# Patient Record
Sex: FEMALE | Race: BLACK OR AFRICAN AMERICAN | NOT HISPANIC OR LATINO | Employment: UNEMPLOYED | URBAN - METROPOLITAN AREA
[De-identification: names, ages, dates, MRNs, and addresses within clinical notes are randomized per-mention and may not be internally consistent; named-entity substitution may affect disease eponyms.]

---

## 2019-06-17 ENCOUNTER — HOSPITAL ENCOUNTER (EMERGENCY)
Facility: HOSPITAL | Age: 29
Discharge: HOME | End: 2019-06-17
Attending: EMERGENCY MEDICINE
Payer: MEDICAID

## 2019-06-17 VITALS
OXYGEN SATURATION: 99 % | TEMPERATURE: 98 F | SYSTOLIC BLOOD PRESSURE: 126 MMHG | BODY MASS INDEX: 33.17 KG/M2 | RESPIRATION RATE: 20 BRPM | WEIGHT: 206.38 LBS | DIASTOLIC BLOOD PRESSURE: 72 MMHG | HEIGHT: 66 IN | HEART RATE: 81 BPM

## 2019-06-17 DIAGNOSIS — M25.561 RIGHT KNEE PAIN: ICD-10-CM

## 2019-06-17 DIAGNOSIS — S80.01XA CONTUSION OF RIGHT KNEE, INITIAL ENCOUNTER: ICD-10-CM

## 2019-06-17 DIAGNOSIS — M23.91 INTERNAL DERANGEMENT OF RIGHT KNEE: Primary | ICD-10-CM

## 2019-06-17 PROCEDURE — 99283 EMERGENCY DEPT VISIT LOW MDM: CPT | Mod: 25

## 2019-06-17 PROCEDURE — 25000003 PHARM REV CODE 250: Performed by: EMERGENCY MEDICINE

## 2019-06-17 PROCEDURE — 29505 APPLICATION LONG LEG SPLINT: CPT | Mod: RT

## 2019-06-17 PROCEDURE — 86703 HIV-1/HIV-2 1 RESULT ANTBDY: CPT

## 2019-06-17 RX ORDER — NAPROXEN 500 MG/1
500 TABLET ORAL
Status: COMPLETED | OUTPATIENT
Start: 2019-06-17 | End: 2019-06-17

## 2019-06-17 RX ORDER — NAPROXEN 500 MG/1
500 TABLET ORAL 2 TIMES DAILY WITH MEALS
Qty: 20 TABLET | Refills: 0 | Status: SHIPPED | OUTPATIENT
Start: 2019-06-17 | End: 2019-06-27

## 2019-06-17 RX ADMIN — NAPROXEN 500 MG: 500 TABLET ORAL at 07:06

## 2019-06-17 NOTE — ED NOTES
"Pt c/o right "knee buckling" x2 days ago while walking. Pain rated 10/10.    Patient identifiers verified and correct for Joleen Horner.    LOC: The patient is awake, alert and aware of environment with an appropriate affect, the patient is oriented x 3 and speaking appropriately.  APPEARANCE: Patient resting comfortably and in no acute distress, patient is clean and well groomed, patient's clothing is properly fastened.  SKIN: The skin is warm and dry, color consistent with ethnicity, patient has normal skin turgor and moist mucus membranes, skin intact, no breakdown or bruising noted.  MUSCULOSKELETAL: Patient moving all extremities spontaneously. Swelling noted to right knee with some guarding and grimacing.   RESPIRATORY: Airway is open and patent, respirations are spontaneous.  CARDIAC: Patient has a normal rate, no periphreal edema noted, capillary refill < 3 seconds.  ABDOMEN: Soft and non tender to palpation.    "

## 2019-06-17 NOTE — ED PROVIDER NOTES
"SCRIBE #1 NOTE: I, Sixto Holting, am scribing for, and in the presence of, Dontae Montes Jr., MD. I have scribed the entire note.       History     Chief Complaint   Patient presents with    Knee Pain     pt reports R knee "buckled" while walking yesterday; reports swelling and pain to R knee     Review of patient's allergies indicates:  No Known Allergies      History of Present Illness     HPI    6/17/2019, 6:48 AM  History obtained from the patient      History of Present Illness: Joleen Horner is a 28 y.o. female patient who presents to the Emergency Department for evaluation of right knee pain which onset 2 days ago. Pt reports that the she was just walking 2 days ago, when her right knee "buckled" backwards. She reportedly fell backwards but denies falling onto the knee or hitting her head. She reports being seen at [facility] in the past for this same issue, and was dx with a "little tear" in her ACL. She reports associated difficulty walking, swelling, decreased ROM, and right foot numbness. The foot numbness is reportedly a new sx. Symptoms are constant and moderate in severity. No mitigating or exacerbating factors reported. Patient denies any fever, chills, weakness, dizziness, Ha, lightheadedness, back pain, neck pain, and all other sxs at this time. No further complaints or concerns at this time.     Arrival mode: Personal vehicle    PCP: Primary Doctor No        Past Medical History:  History reviewed. No pertinent medical history.     Past Surgical History:  History reviewed. No pertinent surgical history.     Family History:  History reviewed. No pertinent family history.     Social History:  Social History     Tobacco Use    Smoking status: Unknown   Substance and Sexual Activity    Alcohol use: Unknown    Drug use: Unknown    Sexual activity: Unknown        Review of Systems     Review of Systems   Constitutional: Negative for chills and fever.   HENT: Negative for sore throat.    Respiratory: " Negative for shortness of breath.    Cardiovascular: Negative for chest pain.   Gastrointestinal: Negative for nausea and vomiting.   Genitourinary: Negative for dysuria.   Musculoskeletal: Positive for arthralgias (right knee), gait problem and joint swelling. Negative for back pain and neck pain.        (+) Decreased ROM   Skin: Negative for rash.   Neurological: Positive for numbness (right foot). Negative for dizziness, weakness, light-headedness and headaches.   Hematological: Does not bruise/bleed easily.   All other systems reviewed and are negative.       Physical Exam     Initial Vitals [06/17/19 0643]   BP Pulse Resp Temp SpO2   118/81 81 16 98.9 °F (37.2 °C) 97 %      MAP       --          Physical Exam  Nursing Notes and Vital Signs Reviewed.  Constitutional: Patient is in no acute distress. Well-developed and well-nourished.  Head: Atraumatic. Normocephalic.  Eyes: PERRL. EOM intact. Conjunctivae are not pale. No scleral icterus.  ENT: Mucous membranes are moist. Oropharynx is clear and symmetric.    Neck: Supple. Full ROM. No lymphadenopathy.  Cardiovascular: Regular rate. Regular rhythm. No murmurs, rubs, or gallops. Distal pulses are 2+ and symmetric.  Pulmonary/Chest: No respiratory distress. Clear to auscultation bilaterally. No wheezing or rales.  Abdominal: Soft and non-distended.  There is no tenderness.  No rebound, guarding, or rigidity.   Musculoskeletal: Moves all extremities. No obvious deformities.   Right Knee:  No obvious deformity. there is edema to the the anterior portion of the right knee. Limited ROM secondary to pain.  No increased warmth, erythema, induration or fluctuance.  No ligament laxity. DP and PT pulses are 2+ b/l.  Normal capillary refill.  Distal sensation is intact. N/V intact.  Skin: Warm and dry.  Neurological:  Alert, awake, and appropriate.  Normal speech.  No acute focal neurological deficits are appreciated.  Psychiatric: Normal affect. Good eye contact.  "Appropriate in content.     ED Course   Splint Application  Date/Time: 2019 7:44 AM  Performed by: Dontae Montes Jr., MD  Authorized by: Dontae Montes Jr., MD   Consent Done: Yes  Consent: Verbal consent obtained.  Risks and benefits: risks, benefits and alternatives were discussed  Consent given by: patient  Patient understanding: patient states understanding of the procedure being performed  Patient identity confirmed: , name, MRN and verbally with patient  Location details: right knee  Splint type: Knee immobilizer.  Post-procedure: The splinted body part was neurovascularly unchanged following the procedure.  Patient tolerance: Patient tolerated the procedure well with no immediate complications        ED Vital Signs:  Vitals:    19 0643 19 0808   BP: 118/81 126/72   Pulse: 81 81   Resp: 16 20   Temp: 98.9 °F (37.2 °C) 98.2 °F (36.8 °C)   TempSrc: Oral Oral   SpO2: 97% 99%   Weight: 93.6 kg (206 lb 5.6 oz)    Height: 5' 6" (1.676 m)        Abnormal Lab Results:  Labs Reviewed   HIV 1 / 2 ANTIBODY        All Lab Results:  None    Imaging Results:  Imaging Results          X-Ray Knee Complete 4 Or More Views Right (Final result)  Result time 19 07:18:34    Final result by Anthony Schmitz MD (19 07:18:34)                 Impression:      See above.      Electronically signed by: Anthony Schmitz MD  Date:    2019  Time:    07:18             Narrative:    EXAMINATION:  XR KNEE COMP 4 OR MORE VIEWS RIGHT    CLINICAL HISTORY:  Right knee injury with pain and swelling.,    TECHNIQUE:  Standard radiography performed.    COMPARISON:  None    FINDINGS:  No acute bony abnormality.  Possible small suprapatellar joint effusion.  Otherwise negative.                                        The Emergency Provider reviewed the vital signs and test results, which are outlined above.     ED Discussion     7:45 AM: Reassessed pt at this time.  Pt states her condition has improved at this " time. Discussed with pt all pertinent ED information and results. Discussed pt dx and plan of tx. Gave pt all f/u and return to the ED instructions. All questions and concerns were addressed at this time. Pt expresses understanding of information and instructions, and is comfortable with plan to discharge. Pt is stable for discharge.    I discussed with patient and/or family/caretaker that evaluation in the ED does not suggest any emergent or life threatening medical conditions requiring immediate intervention beyond what was provided in the ED, and I believe patient is safe for discharge.  Regardless, an unremarkable evaluation in the ED does not preclude the development or presence of a serious of life threatening condition. As such, patient was instructed to return immediately for any worsening or change in current symptoms.    Regarding KNEE PAIN: Patient instructed to follow prescribed therapy.  I encouraged patient to get plenty of rest, work to obtain/maintain healthy weight and BMI, exercise to improve muscle strength and motion to joint area, protect joint from further injury, and avoid overexerting extremity - especially when stiffness or pain is present. Advised patient to follow up with primary care provider in one week if symptoms are still present or condition worsens.     Regarding CONTUSIONS, I advised patient to: REST the injured area or use it less than usual; apply ICE to decrease swelling and pain and help prevent tissue damage; use COMPRESSION with an elastic bandage to support the area and decrease swelling; ELEVATE injured body part above the level of the heart to help decrease pain and swelling; AVOID using massage or massage to acute injuries as it may slow healing of the area; AVOID drinking alcohol as it may slow healing of the injury; and avoid stretching injured muscles. Advised patient to return to the emergency department or contact primary care provider if: having trouble moving  injured area; notice tingling or numbness in or near the injured area; extremity below the bruise gets cold or turns pale; a new lump develops in the injured area; symptoms do not improve with treatment after 4 to 5 days; there is any questions or concerns about the condition or treatment plan.       ED Medication(s):  Medications   naproxen tablet 500 mg (500 mg Oral Given 6/17/19 0721)       Discharge Medication List as of 6/17/2019  7:46 AM      START taking these medications    Details   naproxen (EC NAPROSYN) 500 MG EC tablet Take 1 tablet (500 mg total) by mouth 2 (two) times daily with meals. for 10 days, Starting Mon 6/17/2019, Until Thu 6/27/2019, Print             Follow-up Information     [facility]. Schedule an appointment as soon as possible for a visit in 1 week.    Contact information:  [address] [place] [address] [phone]             [facility] - Internal Medicine. Schedule an appointment as soon as possible for a visit in 1 week.    Specialty:  Internal Medicine  Contact information:  [address] [address] [address] [phone]           [facility] - .    Specialty:  Emergency Medicine  Why:  As needed, If symptoms worsen  Contact information:  [address] [facility] [address] [phone]                       Medical Decision Making:   Clinical Tests:   Radiological Study: Ordered and Reviewed             Scribe Attestation:   Scribe #1: I performed the above scribed service and the documentation accurately describes the services I performed. I attest to the accuracy of the note.     Attending:   Physician Attestation Statement for Scribe #1: I, Dontae Montes Jr., MD, personally performed the services described in this documentation, as scribed by Sixto Pool, in my presence, and it is both accurate and complete.           Clinical Impression       ICD-10-CM ICD-9-CM   1. Internal derangement of right  knee M23.91 717.9   2. Right knee pain M25.561 719.46   3. Contusion of right knee, initial encounter S80.01XA 924.11       Disposition:   Disposition: Discharged  Condition: Stable         Dontae Montes Jr., MD  06/21/19 1054

## 2019-06-18 LAB — HIV 1+2 AB+HIV1 P24 AG SERPL QL IA: NEGATIVE
